# Patient Record
Sex: MALE | Race: WHITE | NOT HISPANIC OR LATINO | Employment: FULL TIME | URBAN - METROPOLITAN AREA
[De-identification: names, ages, dates, MRNs, and addresses within clinical notes are randomized per-mention and may not be internally consistent; named-entity substitution may affect disease eponyms.]

---

## 2024-01-10 ENCOUNTER — CONSULT (OUTPATIENT)
Dept: SURGERY | Facility: CLINIC | Age: 39
End: 2024-01-10
Payer: COMMERCIAL

## 2024-01-10 VITALS
OXYGEN SATURATION: 98 % | WEIGHT: 311.4 LBS | HEIGHT: 71 IN | BODY MASS INDEX: 43.6 KG/M2 | HEART RATE: 92 BPM | TEMPERATURE: 98.3 F | RESPIRATION RATE: 18 BRPM | SYSTOLIC BLOOD PRESSURE: 129 MMHG | DIASTOLIC BLOOD PRESSURE: 84 MMHG

## 2024-01-10 DIAGNOSIS — M79.89 MASS OF SOFT TISSUE OF NECK: Primary | ICD-10-CM

## 2024-01-10 PROCEDURE — 99203 OFFICE O/P NEW LOW 30 MIN: CPT | Performed by: SURGERY

## 2024-01-10 RX ORDER — CHOLECALCIFEROL (VITAMIN D3) 1250 MCG
CAPSULE ORAL
COMMUNITY

## 2024-01-10 RX ORDER — ENALAPRIL MALEATE 20 MG/1
TABLET ORAL
COMMUNITY

## 2024-01-10 RX ORDER — SIMVASTATIN 10 MG
TABLET ORAL
COMMUNITY

## 2024-01-10 RX ORDER — ORAL SEMAGLUTIDE 14 MG/1
TABLET ORAL
COMMUNITY

## 2024-01-10 RX ORDER — METFORMIN HYDROCHLORIDE 500 MG/1
TABLET, EXTENDED RELEASE ORAL
COMMUNITY

## 2024-01-10 NOTE — PROGRESS NOTES
Assessment/Plan:    Mass of soft tissue of neck  38-year-old male with a soft tissue mass of his left posterior neck.    Plan:  This is most likely a mildly infected sebaceous cyst.  However given its location, and the posterior chain of lymph nodes in this area, would like to obtain an ultrasound to ensure this is not an infected lymph node.  Following his ultrasound he can return to clinic and we can discuss management moving forward.     Diagnoses and all orders for this visit:    Mass of soft tissue of neck  -     US head neck soft tissue; Future    Other orders  -     Multiple Vitamin (MULTIVITAMIN ADULT PO); Take 1 tablet by mouth daily  -     aspirin-acetaminophen-caffeine (EXCEDRIN MIGRAINE) 250-250-65 MG per tablet; Take 1 tablet by mouth  -     Cholecalciferol (Vitamin D3) 1.25 MG (44527 UT) CAPS  -     enalapril (VASOTEC) 20 mg tablet  -     metFORMIN (GLUCOPHAGE-XR) 500 mg 24 hr tablet  -     Rybelsus 14 MG tablet  -     simvastatin (ZOCOR) 10 mg tablet          Subjective:      Patient ID: Alessandro Camejo is a 38 y.o. male.    38-year-old male presents with a soft tissue mass on his left posterior neck.  Patient states that between Thanksgiving and Hemal he noticed a slowly enlarging lump on the left posterior neck.  This has slowly increased in size since that time and is causing him mild pain and discomfort.  He denies any fevers, chills, chest pain, shortness of breath.  He denies any significant drainage in the area.  He does note with some flexion of his neck he has some discomfort in the region.        The following portions of the patient's history were reviewed and updated as appropriate: He  has a past medical history of Diabetes mellitus (HCC), Fissure, anal, Obesity, PONV (postoperative nausea and vomiting), and Pyloric stenosis in pediatric patient.  He   Patient Active Problem List    Diagnosis Date Noted    Mass of soft tissue of neck 01/10/2024     He  has a past surgical history  that includes Kidney stone surgery.  His family history is not on file.  He  reports that he has never smoked. He has never used smokeless tobacco. He reports current alcohol use. He reports current drug use. Drug: Marijuana.  Current Outpatient Medications   Medication Sig Dispense Refill    aspirin-acetaminophen-caffeine (EXCEDRIN MIGRAINE) 250-250-65 MG per tablet Take 1 tablet by mouth      Cholecalciferol (Vitamin D3) 1.25 MG (16603 UT) CAPS       enalapril (VASOTEC) 20 mg tablet       metFORMIN (GLUCOPHAGE-XR) 500 mg 24 hr tablet       Multiple Vitamin (MULTIVITAMIN ADULT PO) Take 1 tablet by mouth daily      Rybelsus 14 MG tablet       simvastatin (ZOCOR) 10 mg tablet        No current facility-administered medications for this visit.     Current Outpatient Medications on File Prior to Visit   Medication Sig    aspirin-acetaminophen-caffeine (EXCEDRIN MIGRAINE) 250-250-65 MG per tablet Take 1 tablet by mouth    Cholecalciferol (Vitamin D3) 1.25 MG (07940 UT) CAPS     enalapril (VASOTEC) 20 mg tablet     metFORMIN (GLUCOPHAGE-XR) 500 mg 24 hr tablet     Multiple Vitamin (MULTIVITAMIN ADULT PO) Take 1 tablet by mouth daily    Rybelsus 14 MG tablet     simvastatin (ZOCOR) 10 mg tablet      No current facility-administered medications on file prior to visit.     He has No Known Allergies..    Review of Systems   Constitutional:  Negative for appetite change, chills, diaphoresis and fever.   HENT:  Negative for nosebleeds and trouble swallowing.    Eyes: Negative.    Respiratory:  Negative for cough, shortness of breath and wheezing.    Cardiovascular:  Negative for chest pain, palpitations and leg swelling.   Gastrointestinal:  Negative for abdominal distention, abdominal pain, nausea and vomiting.   Genitourinary:  Negative for difficulty urinating, flank pain and frequency.   Musculoskeletal:  Negative for arthralgias, joint swelling and myalgias.   Skin:  Positive for wound. Negative for pallor and rash.  "  Neurological:  Negative for dizziness, facial asymmetry and speech difficulty.   Hematological:  Does not bruise/bleed easily.   Psychiatric/Behavioral:  Negative for agitation and confusion.    All other systems reviewed and are negative.        Objective:      /84 (BP Location: Left arm, Patient Position: Sitting, Cuff Size: Large)   Pulse 92   Temp 98.3 °F (36.8 °C) (Temporal)   Resp 18   Ht 5' 11\" (1.803 m)   Wt (!) 141 kg (311 lb 6.4 oz)   SpO2 98%   BMI 43.43 kg/m²          Physical Exam  Vitals and nursing note reviewed.   Constitutional:       General: He is not in acute distress.     Appearance: Normal appearance. He is not toxic-appearing.   HENT:      Head: Normocephalic and atraumatic.      Mouth/Throat:      Mouth: Mucous membranes are moist.   Eyes:      Extraocular Movements: Extraocular movements intact.      Pupils: Pupils are equal, round, and reactive to light.   Neck:      Comments: Left posterior neck with a 3 x 2 cm area of induration and firmness associated with some mild overlying erythema.  Cardiovascular:      Rate and Rhythm: Normal rate and regular rhythm.      Pulses: Normal pulses.   Pulmonary:      Effort: Pulmonary effort is normal. No respiratory distress.      Breath sounds: Normal breath sounds. No wheezing.   Abdominal:      General: There is no distension.      Palpations: Abdomen is soft. There is no mass.      Tenderness: There is no abdominal tenderness. There is no guarding or rebound.      Hernia: No hernia is present.   Musculoskeletal:         General: No swelling or deformity. Normal range of motion.      Cervical back: Normal range of motion and neck supple.      Right lower leg: No edema.      Left lower leg: No edema.   Skin:     General: Skin is warm and dry.      Coloration: Skin is not jaundiced.   Neurological:      General: No focal deficit present.      Mental Status: He is alert and oriented to person, place, and time.   Psychiatric:         Mood " and Affect: Mood normal.         Behavior: Behavior normal.

## 2024-01-10 NOTE — ASSESSMENT & PLAN NOTE
38-year-old male with a soft tissue mass of his left posterior neck.    Plan:  This is most likely a mildly infected sebaceous cyst.  However given its location, and the posterior chain of lymph nodes in this area, would like to obtain an ultrasound to ensure this is not an infected lymph node.  Following his ultrasound he can return to clinic and we can discuss management moving forward.

## 2024-01-17 ENCOUNTER — HOSPITAL ENCOUNTER (OUTPATIENT)
Dept: ULTRASOUND IMAGING | Facility: HOSPITAL | Age: 39
Discharge: HOME | End: 2024-01-17
Attending: SURGERY
Payer: COMMERCIAL

## 2024-01-17 DIAGNOSIS — M79.89 MASS OF SOFT TISSUE OF NECK: ICD-10-CM

## 2024-01-17 PROCEDURE — 76536 US EXAM OF HEAD AND NECK: CPT

## 2024-02-14 ENCOUNTER — OFFICE VISIT (OUTPATIENT)
Dept: SURGERY | Facility: CLINIC | Age: 39
End: 2024-02-14
Payer: COMMERCIAL

## 2024-02-14 VITALS
BODY MASS INDEX: 43.79 KG/M2 | OXYGEN SATURATION: 96 % | HEIGHT: 71 IN | WEIGHT: 312.8 LBS | RESPIRATION RATE: 19 BRPM | TEMPERATURE: 98.4 F | SYSTOLIC BLOOD PRESSURE: 125 MMHG | DIASTOLIC BLOOD PRESSURE: 79 MMHG | HEART RATE: 83 BPM

## 2024-02-14 DIAGNOSIS — E11.59 HIGH BLOOD PRESSURE ASSOCIATED WITH DIABETES: ICD-10-CM

## 2024-02-14 DIAGNOSIS — M79.89 MASS OF SOFT TISSUE OF NECK: Primary | ICD-10-CM

## 2024-02-14 DIAGNOSIS — I15.2 HIGH BLOOD PRESSURE ASSOCIATED WITH DIABETES: ICD-10-CM

## 2024-02-14 PROCEDURE — 99213 OFFICE O/P EST LOW 20 MIN: CPT | Performed by: SURGERY

## 2024-02-14 RX ORDER — AMOXICILLIN AND CLAVULANATE POTASSIUM 875; 125 MG/1; MG/1
1 TABLET, FILM COATED ORAL EVERY 12 HOURS SCHEDULED
Qty: 14 TABLET | Refills: 0 | Status: SHIPPED | OUTPATIENT
Start: 2024-02-14 | End: 2024-02-21

## 2024-02-14 NOTE — PROGRESS NOTES
Assessment/Plan:    Mass of soft tissue of neck  38-year-old male with an indeterminant soft tissue mass of the left lower neck.    Plan:  Upon review of his ultrasound, this is either a sebaceous cyst or some suppurative lymphadenitis.  As such I would like to treat with a week of antibiotics to see if he has improvement in his symptomatology, with subsequent MRI of the neck.  Following this he will return to clinic and we can discuss next steps.     Diagnoses and all orders for this visit:    Mass of soft tissue of neck  -     MRI soft tissue neck wo contrast; Future  -     amoxicillin-clavulanate (AUGMENTIN) 875-125 mg per tablet; Take 1 tablet by mouth every 12 (twelve) hours for 7 days    High blood pressure associated with diabetes     Other orders  -     Multiple Vitamins-Minerals (Centrum Men) TABS          Subjective:      Patient ID: Alessandro Camejo is a 38 y.o. male.    38-year-old male well-known to me presents with a mass of the soft tissues of his left neck.  Since her last visit he underwent ultrasound of the neck which I reviewed in PACS.  This was done on 1/17/2024.  Findings were:3.8 cm heterogeneous collection in the region of palpable abnormality with adjacent hyperemia and tract to the skin surface. Findings favored to represent suppurative lymphadenitis versus sebaceous cyst/abscess. Recommend close clinical follow-up with   repeat imaging for change in size/symptoms.    Currently, he feels about the same with intermittent episodes of pain, redness, and drainage from the wound.  Denies any fevers, chills, chest pain, shortness of breath.  Is tolerating a diet, moving his bowels normally.          The following portions of the patient's history were reviewed and updated as appropriate: He  has a past medical history of Diabetes mellitus (HCC), Fissure, anal, Obesity, PONV (postoperative nausea and vomiting), and Pyloric stenosis in pediatric patient.  He   Patient Active Problem List     Diagnosis Date Noted    High blood pressure associated with diabetes  02/14/2024    Mass of soft tissue of neck 01/10/2024     He  has a past surgical history that includes Kidney stone surgery.  His family history is not on file.  He  reports that he has never smoked. He has never used smokeless tobacco. He reports current alcohol use. He reports current drug use. Drug: Marijuana.  Current Outpatient Medications   Medication Sig Dispense Refill    amoxicillin-clavulanate (AUGMENTIN) 875-125 mg per tablet Take 1 tablet by mouth every 12 (twelve) hours for 7 days 14 tablet 0    aspirin-acetaminophen-caffeine (EXCEDRIN MIGRAINE) 250-250-65 MG per tablet Take 1 tablet by mouth      Cholecalciferol (Vitamin D3) 1.25 MG (72377 UT) CAPS       enalapril (VASOTEC) 20 mg tablet       metFORMIN (GLUCOPHAGE-XR) 500 mg 24 hr tablet       Multiple Vitamin (MULTIVITAMIN ADULT PO) Take 1 tablet by mouth daily      Multiple Vitamins-Minerals (Centrum Men) TABS       Rybelsus 14 MG tablet       simvastatin (ZOCOR) 10 mg tablet        No current facility-administered medications for this visit.     Current Outpatient Medications on File Prior to Visit   Medication Sig    aspirin-acetaminophen-caffeine (EXCEDRIN MIGRAINE) 250-250-65 MG per tablet Take 1 tablet by mouth    Cholecalciferol (Vitamin D3) 1.25 MG (07151 UT) CAPS     enalapril (VASOTEC) 20 mg tablet     metFORMIN (GLUCOPHAGE-XR) 500 mg 24 hr tablet     Multiple Vitamin (MULTIVITAMIN ADULT PO) Take 1 tablet by mouth daily    Multiple Vitamins-Minerals (Centrum Men) TABS     Rybelsus 14 MG tablet     simvastatin (ZOCOR) 10 mg tablet      No current facility-administered medications on file prior to visit.     He has No Known Allergies..    Review of Systems   Constitutional:  Negative for appetite change, chills, diaphoresis and fever.   HENT:  Negative for nosebleeds and trouble swallowing.    Eyes: Negative.    Respiratory:  Negative for cough, shortness of breath and  "wheezing.    Cardiovascular:  Negative for chest pain, palpitations and leg swelling.   Gastrointestinal:  Negative for abdominal distention, abdominal pain, nausea and vomiting.   Genitourinary:  Negative for difficulty urinating, flank pain and frequency.   Musculoskeletal:  Negative for arthralgias, joint swelling and myalgias.   Skin:  Positive for wound. Negative for pallor and rash.   Neurological:  Negative for dizziness, facial asymmetry and speech difficulty.   Hematological:  Does not bruise/bleed easily.   Psychiatric/Behavioral:  Negative for agitation and confusion.    All other systems reviewed and are negative.        Objective:      /79 (BP Location: Left arm, Patient Position: Sitting, Cuff Size: Large)   Pulse 83   Temp 98.4 °F (36.9 °C) (Temporal)   Resp 19   Ht 5' 11\" (1.803 m)   Wt (!) 142 kg (312 lb 12.8 oz)   SpO2 96%   BMI 43.63 kg/m²          Physical Exam  Vitals and nursing note reviewed.   Constitutional:       General: He is not in acute distress.     Appearance: Normal appearance. He is not toxic-appearing.   HENT:      Head: Normocephalic and atraumatic.      Mouth/Throat:      Mouth: Mucous membranes are moist.   Eyes:      Extraocular Movements: Extraocular movements intact.      Pupils: Pupils are equal, round, and reactive to light.   Cardiovascular:      Rate and Rhythm: Normal rate and regular rhythm.      Pulses: Normal pulses.   Pulmonary:      Effort: Pulmonary effort is normal. No respiratory distress.      Breath sounds: Normal breath sounds. No wheezing.   Abdominal:      General: There is no distension.      Palpations: Abdomen is soft. There is no mass.      Tenderness: There is no abdominal tenderness. There is no guarding or rebound.      Hernia: No hernia is present.   Musculoskeletal:         General: No swelling or deformity. Normal range of motion.      Cervical back: Normal range of motion and neck supple.      Right lower leg: No edema.      Left " lower leg: No edema.   Skin:     General: Skin is warm and dry.      Coloration: Skin is not jaundiced.      Comments: Left base of the neck wound with an area of approximately 4 x 2 cm of induration with minimal overlying erythema.  No active drainage.   Neurological:      General: No focal deficit present.      Mental Status: He is alert and oriented to person, place, and time.   Psychiatric:         Mood and Affect: Mood normal.         Behavior: Behavior normal.

## 2024-02-14 NOTE — ASSESSMENT & PLAN NOTE
38-year-old male with an indeterminant soft tissue mass of the left lower neck.    Plan:  Upon review of his ultrasound, this is either a sebaceous cyst or some suppurative lymphadenitis.  As such I would like to treat with a week of antibiotics to see if he has improvement in his symptomatology, with subsequent MRI of the neck.  Following this he will return to clinic and we can discuss next steps.

## 2024-03-03 ENCOUNTER — HOSPITAL ENCOUNTER (OUTPATIENT)
Dept: MRI IMAGING | Facility: HOSPITAL | Age: 39
Discharge: HOME | End: 2024-03-03
Attending: SURGERY
Payer: COMMERCIAL

## 2024-03-03 DIAGNOSIS — M79.89 MASS OF SOFT TISSUE OF NECK: ICD-10-CM

## 2024-03-03 PROCEDURE — G1004 CDSM NDSC: HCPCS

## 2024-03-03 PROCEDURE — 70540 MRI ORBIT/FACE/NECK W/O DYE: CPT

## 2024-03-13 ENCOUNTER — OFFICE VISIT (OUTPATIENT)
Dept: SURGERY | Facility: CLINIC | Age: 39
End: 2024-03-13
Payer: COMMERCIAL

## 2024-03-13 VITALS
OXYGEN SATURATION: 97 % | HEIGHT: 71 IN | RESPIRATION RATE: 18 BRPM | BODY MASS INDEX: 42.64 KG/M2 | HEART RATE: 94 BPM | WEIGHT: 304.6 LBS | TEMPERATURE: 98.4 F | SYSTOLIC BLOOD PRESSURE: 141 MMHG | DIASTOLIC BLOOD PRESSURE: 85 MMHG

## 2024-03-13 DIAGNOSIS — M79.89 MASS OF SOFT TISSUE OF NECK: Primary | ICD-10-CM

## 2024-03-13 PROCEDURE — 99213 OFFICE O/P EST LOW 20 MIN: CPT | Performed by: SURGERY

## 2024-03-13 NOTE — ASSESSMENT & PLAN NOTE
38-year-old male with a history of a left posterior neck soft tissue mass, improved.    Plan:  Symptomatology was improved and drainage stopped with antibiotics.  MRI was mostly unrevealing but showed no necessarily concerning findings, just some resolving edema/inflammation.  We will hold off on any surgical intervention at this time, and he may return to clinic as needed.  We did discuss, that should his symptoms return we would plan on an excision in the operating room of this lesion.

## 2024-03-13 NOTE — PROGRESS NOTES
Assessment/Plan:    Mass of soft tissue of neck  38-year-old male with a history of a left posterior neck soft tissue mass, improved.    Plan:  Symptomatology was improved and drainage stopped with antibiotics.  MRI was mostly unrevealing but showed no necessarily concerning findings, just some resolving edema/inflammation.  We will hold off on any surgical intervention at this time, and he may return to clinic as needed.  We did discuss, that should his symptoms return we would plan on an excision in the operating room of this lesion.     Diagnoses and all orders for this visit:    Mass of soft tissue of neck          Subjective:      Patient ID: Alessandro Camejo is a 38 y.o. male.    38-year-old male well-known to me for a left neck soft tissue mass.  Since his last visit, he completed a 1 week course of antibiotics.  The drainage and local inflammatory changes around his neck have completely resolved.  He denies any pain, fevers, chills, chest pain, or shortness of breath.  He has noticed no erythema or drainage from the wound.  Since her last visit he underwent MRI of the neck which I reviewed in PACS from 3/3/2024.  This was consistent with nonspecific inflammatory changes underneath the skin, with no notable masses or lesions.        The following portions of the patient's history were reviewed and updated as appropriate: He  has a past medical history of Diabetes mellitus (HCC), Fissure, anal, Obesity, PONV (postoperative nausea and vomiting), and Pyloric stenosis in pediatric patient.  He   Patient Active Problem List    Diagnosis Date Noted    High blood pressure associated with diabetes  02/14/2024    Mass of soft tissue of neck 01/10/2024     He  has a past surgical history that includes Kidney stone surgery.  His family history is not on file.  He  reports that he has never smoked. He has never used smokeless tobacco. He reports current alcohol use. He reports current drug use. Drug: Marijuana.  Current  Outpatient Medications   Medication Sig Dispense Refill    aspirin-acetaminophen-caffeine (EXCEDRIN MIGRAINE) 250-250-65 MG per tablet Take 1 tablet by mouth      Cholecalciferol (Vitamin D3) 1.25 MG (82032 UT) CAPS       enalapril (VASOTEC) 20 mg tablet       metFORMIN (GLUCOPHAGE-XR) 500 mg 24 hr tablet       Multiple Vitamin (MULTIVITAMIN ADULT PO) Take 1 tablet by mouth daily      Multiple Vitamins-Minerals (Centrum Men) TABS       Rybelsus 14 MG tablet       simvastatin (ZOCOR) 10 mg tablet        No current facility-administered medications for this visit.     Current Outpatient Medications on File Prior to Visit   Medication Sig    aspirin-acetaminophen-caffeine (EXCEDRIN MIGRAINE) 250-250-65 MG per tablet Take 1 tablet by mouth    Cholecalciferol (Vitamin D3) 1.25 MG (02943 UT) CAPS     enalapril (VASOTEC) 20 mg tablet     metFORMIN (GLUCOPHAGE-XR) 500 mg 24 hr tablet     Multiple Vitamin (MULTIVITAMIN ADULT PO) Take 1 tablet by mouth daily    Multiple Vitamins-Minerals (Centrum Men) TABS     Rybelsus 14 MG tablet     simvastatin (ZOCOR) 10 mg tablet      No current facility-administered medications on file prior to visit.     He has No Known Allergies..    Review of Systems   Constitutional:  Negative for appetite change, chills, diaphoresis and fever.   HENT:  Negative for nosebleeds and trouble swallowing.    Eyes: Negative.    Respiratory:  Negative for cough, shortness of breath and wheezing.    Cardiovascular:  Negative for chest pain, palpitations and leg swelling.   Gastrointestinal:  Negative for abdominal distention, abdominal pain, nausea and vomiting.   Genitourinary:  Negative for difficulty urinating, flank pain and frequency.   Musculoskeletal:  Negative for arthralgias, joint swelling and myalgias.   Skin:  Positive for rash. Negative for pallor.   Neurological:  Negative for dizziness, facial asymmetry and speech difficulty.   Hematological:  Does not bruise/bleed easily.  "  Psychiatric/Behavioral:  Negative for agitation and confusion.    All other systems reviewed and are negative.        Objective:      /85 (BP Location: Left arm, Patient Position: Sitting, Cuff Size: Large)   Pulse 94   Temp 98.4 °F (36.9 °C) (Temporal)   Resp 18   Ht 5' 11\" (1.803 m)   Wt (!) 138 kg (304 lb 9.6 oz)   SpO2 97%   BMI 42.48 kg/m²          Physical Exam  Vitals and nursing note reviewed.   Constitutional:       General: He is not in acute distress.     Appearance: Normal appearance. He is not toxic-appearing.   HENT:      Head: Normocephalic and atraumatic.      Mouth/Throat:      Mouth: Mucous membranes are moist.   Eyes:      Extraocular Movements: Extraocular movements intact.      Pupils: Pupils are equal, round, and reactive to light.   Neck:      Comments: Small posterior neck wound, completely closed.  No erythema, induration, or drainage.  Cardiovascular:      Rate and Rhythm: Normal rate and regular rhythm.      Pulses: Normal pulses.   Pulmonary:      Effort: Pulmonary effort is normal. No respiratory distress.      Breath sounds: Normal breath sounds. No wheezing.   Abdominal:      General: There is no distension.      Palpations: Abdomen is soft. There is no mass.      Tenderness: There is no abdominal tenderness. There is no guarding or rebound.      Hernia: No hernia is present.   Musculoskeletal:         General: No swelling or deformity. Normal range of motion.      Cervical back: Normal range of motion and neck supple.      Right lower leg: No edema.      Left lower leg: No edema.   Skin:     General: Skin is warm and dry.      Coloration: Skin is not jaundiced.   Neurological:      General: No focal deficit present.      Mental Status: He is alert and oriented to person, place, and time.   Psychiatric:         Mood and Affect: Mood normal.         Behavior: Behavior normal.           "

## 2025-03-21 ENCOUNTER — OFFICE VISIT (OUTPATIENT)
Dept: SURGERY | Facility: CLINIC | Age: 40
End: 2025-03-21
Payer: COMMERCIAL

## 2025-03-21 DIAGNOSIS — J34.3 NASAL TURBINATE HYPERTROPHY: ICD-10-CM

## 2025-03-21 DIAGNOSIS — M79.89 MASS OF SOFT TISSUE OF NECK: Primary | ICD-10-CM

## 2025-03-21 PROCEDURE — 99214 OFFICE O/P EST MOD 30 MIN: CPT | Performed by: SURGERY

## 2025-03-21 RX ORDER — SULFAMETHOXAZOLE AND TRIMETHOPRIM 800; 160 MG/1; MG/1
1 TABLET ORAL EVERY 12 HOURS SCHEDULED
Qty: 14 TABLET | Refills: 0 | Status: SHIPPED | OUTPATIENT
Start: 2025-03-21 | End: 2025-03-28

## 2025-03-21 NOTE — PROGRESS NOTES
Name: Alessandro Camejo      : 1985      MRN: [id number]  Encounter Provider: Santiago Kim MD  Encounter Date: 3/21/2025   Encounter department: [facility]  :  Assessment & Plan  Mass of soft tissue of neck  39-year-old male with an infected soft tissue mass of his left neck.    Plan:  - Will plan on excision of the mass. Risks and benefits of surgery were reviewed and the patient was amenable. Specific risks reviewed include: post op seroma, hematoma, or recurrence of the mass.  - Will schedule at the patient's earliest convenience.  - I have placed an order for Bactrim to deal with some inflammatory changes prior to going to the operating room.    Orders:    sulfamethoxazole-trimethoprim (BACTRIM DS) 800-160 mg per tablet; Take 1 tablet by mouth every 12 (twelve) hours for 7 days    Case request operating room: EXCISION BIOPSY MASS OF LEFT NECK; Standing    Ambulatory Referral to Otolaryngology; Future    Nasal turbinate hypertrophy  Patient describing swelling and pain for his nasal turbinates, and is requesting a referral to ENT.  I placed that referral.             History of Present Illness   HPI  Alessandro Camejo is a 39 y.o. male who presents with a recurrent soft tissue mass infection.  He has a known left neck soft tissue mass, that states over the past week he has noticed increased swelling and warmth of the area.  Denies any significant drainage.  Denies any fevers, chills, chest pain, shortness of breath.  Is also complaining of significant pain in his nose with swelling of his nasal turbinates.  History obtained from: patient    Review of Systems   Constitutional:  Negative for appetite change, chills, diaphoresis and fever.   HENT:  Negative for nosebleeds and trouble swallowing.    Eyes: Negative.    Respiratory:  Negative for cough, shortness of breath and wheezing.    Cardiovascular:  Negative for chest pain, palpitations and leg swelling.    Gastrointestinal:  Negative for abdominal distention, abdominal pain, nausea and vomiting.   Genitourinary:  Negative for difficulty urinating, flank pain and frequency.   Musculoskeletal:  Negative for arthralgias, joint swelling and myalgias.   Skin:  Negative for pallor and rash.   Neurological:  Negative for dizziness, facial asymmetry and speech difficulty.   Hematological:  Does not bruise/bleed easily.   Psychiatric/Behavioral:  Negative for agitation and confusion.    All other systems reviewed and are negative.    Past Medical History   Past Medical History:   Diagnosis Date    Diabetes mellitus (HCC)     Fissure, anal     Obesity     PONV (postoperative nausea and vomiting)     Pyloric stenosis in pediatric patient      Past Surgical History:   Procedure Laterality Date    KIDNEY STONE SURGERY       No family history on file.   reports that he has never smoked. He has never used smokeless tobacco. He reports current alcohol use. He reports current drug use. Drug: Marijuana.  Current Outpatient Medications   Medication Instructions    aspirin-acetaminophen-caffeine (EXCEDRIN MIGRAINE) 250-250-65 MG per tablet 1 tablet, Oral    Cholecalciferol (Vitamin D3) 1.25 MG (77083 UT) CAPS     enalapril (VASOTEC) 20 mg tablet     metFORMIN (GLUCOPHAGE-XR) 500 mg 24 hr tablet     Multiple Vitamin (MULTIVITAMIN ADULT PO) 1 tablet, Oral, Daily    Multiple Vitamins-Minerals (Centrum Men) TABS     Rybelsus 14 MG tablet     simvastatin (ZOCOR) 10 mg tablet     sulfamethoxazole-trimethoprim (BACTRIM DS) 800-160 mg per tablet 1 tablet, Oral, Every 12 hours scheduled   No Known Allergies   Current Outpatient Medications on File Prior to Visit   Medication Sig Dispense Refill    aspirin-acetaminophen-caffeine (EXCEDRIN MIGRAINE) 250-250-65 MG per tablet Take 1 tablet by mouth      Cholecalciferol (Vitamin D3) 1.25 MG (16000 UT) CAPS       enalapril (VASOTEC) 20 mg tablet       metFORMIN (GLUCOPHAGE-XR) 500 mg 24 hr tablet        Multiple Vitamin (MULTIVITAMIN ADULT PO) Take 1 tablet by mouth daily      Multiple Vitamins-Minerals (Centrum Men) TABS       Rybelsus 14 MG tablet       simvastatin (ZOCOR) 10 mg tablet        No current facility-administered medications on file prior to visit.      Social History     Tobacco Use    Smoking status: Never    Smokeless tobacco: Never   Vaping Use    Vaping status: Never Used   Substance and Sexual Activity    Alcohol use: Yes     Comment: socially    Drug use: Yes     Types: Marijuana    Sexual activity: Not on file        Objective   There were no vitals taken for this visit.     Physical Exam  Vitals and nursing note reviewed.   Constitutional:       General: He is not in acute distress.     Appearance: Normal appearance. He is not ill-appearing.   HENT:      Head: Normocephalic and atraumatic.      Mouth/Throat:      Mouth: Mucous membranes are moist.      Pharynx: Oropharynx is clear.   Eyes:      Extraocular Movements: Extraocular movements intact.   Cardiovascular:      Rate and Rhythm: Normal rate and regular rhythm.   Pulmonary:      Effort: Pulmonary effort is normal. No respiratory distress.      Breath sounds: No stridor. No wheezing.   Abdominal:      General: There is no distension.      Palpations: Abdomen is soft. There is no mass.      Tenderness: There is no abdominal tenderness.      Hernia: No hernia is present.   Musculoskeletal:         General: No swelling or tenderness. Normal range of motion.      Cervical back: Neck supple.   Skin:     General: Skin is warm and dry.      Findings: Lesion present.      Comments: Firmness and dimpling of the skin in the left mid neck with underlying cystic mass.  No significant erythema or induration.   Neurological:      General: No focal deficit present.      Mental Status: He is alert and oriented to person, place, and time. Mental status is at baseline.   Psychiatric:         Mood and Affect: Mood normal.         Behavior: Behavior  normal.

## 2025-03-21 NOTE — ASSESSMENT & PLAN NOTE
Patient describing swelling and pain for his nasal turbinates, and is requesting a referral to ENT.  I placed that referral.

## 2025-03-21 NOTE — H&P (VIEW-ONLY)
Name: Alessandro Camejo      : 1985      MRN: [id number]  Encounter Provider: Santiago Kim MD  Encounter Date: 3/21/2025   Encounter department[facility]  :  Assessment & Plan  Mass of soft tissue of neck  39-year-old male with an infected soft tissue mass of his left neck.    Plan:  - Will plan on excision of the mass. Risks and benefits of surgery were reviewed and the patient was amenable. Specific risks reviewed include: post op seroma, hematoma, or recurrence of the mass.  - Will schedule at the patient's earliest convenience.  - I have placed an order for Bactrim to deal with some inflammatory changes prior to going to the operating room.    Orders:    sulfamethoxazole-trimethoprim (BACTRIM DS) 800-160 mg per tablet; Take 1 tablet by mouth every 12 (twelve) hours for 7 days    Case request operating room: EXCISION BIOPSY MASS OF LEFT NECK; Standing    Ambulatory Referral to Otolaryngology; Future    Nasal turbinate hypertrophy  Patient describing swelling and pain for his nasal turbinates, and is requesting a referral to ENT.  I placed that referral.             History of Present Illness   HPI  Alessandro Camejo is a 39 y.o. male who presents with a recurrent soft tissue mass infection.  He has a known left neck soft tissue mass, that states over the past week he has noticed increased swelling and warmth of the area.  Denies any significant drainage.  Denies any fevers, chills, chest pain, shortness of breath.  Is also complaining of significant pain in his nose with swelling of his nasal turbinates.  History obtained from: patient    Review of Systems   Constitutional:  Negative for appetite change, chills, diaphoresis and fever.   HENT:  Negative for nosebleeds and trouble swallowing.    Eyes: Negative.    Respiratory:  Negative for cough, shortness of breath and wheezing.    Cardiovascular:  Negative for chest pain, palpitations and leg swelling.    Gastrointestinal:  Negative for abdominal distention, abdominal pain, nausea and vomiting.   Genitourinary:  Negative for difficulty urinating, flank pain and frequency.   Musculoskeletal:  Negative for arthralgias, joint swelling and myalgias.   Skin:  Negative for pallor and rash.   Neurological:  Negative for dizziness, facial asymmetry and speech difficulty.   Hematological:  Does not bruise/bleed easily.   Psychiatric/Behavioral:  Negative for agitation and confusion.    All other systems reviewed and are negative.    Past Medical History   Past Medical History:   Diagnosis Date    Diabetes mellitus (HCC)     Fissure, anal     Obesity     PONV (postoperative nausea and vomiting)     Pyloric stenosis in pediatric patient      Past Surgical History:   Procedure Laterality Date    KIDNEY STONE SURGERY       No family history on file.   reports that he has never smoked. He has never used smokeless tobacco. He reports current alcohol use. He reports current drug use. Drug: Marijuana.  Current Outpatient Medications   Medication Instructions    aspirin-acetaminophen-caffeine (EXCEDRIN MIGRAINE) 250-250-65 MG per tablet 1 tablet, Oral    Cholecalciferol (Vitamin D3) 1.25 MG (01463 UT) CAPS     enalapril (VASOTEC) 20 mg tablet     metFORMIN (GLUCOPHAGE-XR) 500 mg 24 hr tablet     Multiple Vitamin (MULTIVITAMIN ADULT PO) 1 tablet, Oral, Daily    Multiple Vitamins-Minerals (Centrum Men) TABS     Rybelsus 14 MG tablet     simvastatin (ZOCOR) 10 mg tablet     sulfamethoxazole-trimethoprim (BACTRIM DS) 800-160 mg per tablet 1 tablet, Oral, Every 12 hours scheduled   No Known Allergies   Current Outpatient Medications on File Prior to Visit   Medication Sig Dispense Refill    aspirin-acetaminophen-caffeine (EXCEDRIN MIGRAINE) 250-250-65 MG per tablet Take 1 tablet by mouth      Cholecalciferol (Vitamin D3) 1.25 MG (95278 UT) CAPS       enalapril (VASOTEC) 20 mg tablet       metFORMIN (GLUCOPHAGE-XR) 500 mg 24 hr tablet        Multiple Vitamin (MULTIVITAMIN ADULT PO) Take 1 tablet by mouth daily      Multiple Vitamins-Minerals (Centrum Men) TABS       Rybelsus 14 MG tablet       simvastatin (ZOCOR) 10 mg tablet        No current facility-administered medications on file prior to visit.      Social History     Tobacco Use    Smoking status: Never    Smokeless tobacco: Never   Vaping Use    Vaping status: Never Used   Substance and Sexual Activity    Alcohol use: Yes     Comment: socially    Drug use: Yes     Types: Marijuana    Sexual activity: Not on file        Objective   There were no vitals taken for this visit.     Physical Exam  Vitals and nursing note reviewed.   Constitutional:       General: He is not in acute distress.     Appearance: Normal appearance. He is not ill-appearing.   HENT:      Head: Normocephalic and atraumatic.      Mouth/Throat:      Mouth: Mucous membranes are moist.      Pharynx: Oropharynx is clear.   Eyes:      Extraocular Movements: Extraocular movements intact.   Cardiovascular:      Rate and Rhythm: Normal rate and regular rhythm.   Pulmonary:      Effort: Pulmonary effort is normal. No respiratory distress.      Breath sounds: No stridor. No wheezing.   Abdominal:      General: There is no distension.      Palpations: Abdomen is soft. There is no mass.      Tenderness: There is no abdominal tenderness.      Hernia: No hernia is present.   Musculoskeletal:         General: No swelling or tenderness. Normal range of motion.      Cervical back: Neck supple.   Skin:     General: Skin is warm and dry.      Findings: Lesion present.      Comments: Firmness and dimpling of the skin in the left mid neck with underlying cystic mass.  No significant erythema or induration.   Neurological:      General: No focal deficit present.      Mental Status: He is alert and oriented to person, place, and time. Mental status is at baseline.   Psychiatric:         Mood and Affect: Mood normal.         Behavior: Behavior  normal.

## 2025-03-21 NOTE — ASSESSMENT & PLAN NOTE
39-year-old male with an infected soft tissue mass of his left neck.    Plan:  - Will plan on excision of the mass. Risks and benefits of surgery were reviewed and the patient was amenable. Specific risks reviewed include: post op seroma, hematoma, or recurrence of the mass.  - Will schedule at the patient's earliest convenience.  - I have placed an order for Bactrim to deal with some inflammatory changes prior to going to the operating room.    Orders:    sulfamethoxazole-trimethoprim (BACTRIM DS) 800-160 mg per tablet; Take 1 tablet by mouth every 12 (twelve) hours for 7 days    Case request operating room: EXCISION BIOPSY MASS OF LEFT NECK; Standing    Ambulatory Referral to Otolaryngology; Future

## 2025-04-01 NOTE — PRE-PROCEDURE INSTRUCTIONS
Pre-Surgery Instructions:   Medication Instructions    aspirin-acetaminophen-caffeine (EXCEDRIN MIGRAINE) 250-250-65 MG per tablet Hold day of surgery.    enalapril (VASOTEC) 20 mg tablet Hold day of surgery.    metFORMIN (GLUCOPHAGE-XR) 500 mg 24 hr tablet Hold day of surgery.    Multiple Vitamin (MULTIVITAMIN ADULT PO) Stop taking 7 days prior to surgery.    Rybelsus 14 MG tablet Hold day of surgery.    simvastatin (ZOCOR) 10 mg tablet Take night before surgery    Medication instructions for day of surgery reviewed. Please take all instructed medications with only a sip of water.       You will receive a call one business day prior to surgery with an arrival time and hospital directions. If your surgery is scheduled on a Monday, the hospital will be calling you on the Friday prior to your surgery. If you have not heard from anyone by 8pm, please call the hospital supervisor through the hospital  at [phone]. ([place] [phone] or [place] [phone]).    Do not eat or drink anything after midnight the night before your surgery, including candy, mints, lifesavers, or chewing gum. Do not drink alcohol 24hrs before your surgery. Try not to smoke at least 24hrs before your surgery.       Follow the pre surgery showering instructions as listed in the “My Surgical Experience Booklet” or otherwise provided by your surgeon's office. Do not use a blade to shave the surgical area 1 week before surgery. It is okay to use a clean electric clippers up to 24 hours before surgery. Do not apply any lotions, creams, including makeup, cologne, deodorant, or perfumes after showering on the day of your surgery. Do not use dry shampoo, hair spray, hair gel, or any type of hair products.     No contact lenses, eye make-up, or artificial eyelashes. Remove nail polish, including gel polish, and any artificial, gel, or acrylic nails if possible. Remove all jewelry including rings and body piercing jewelry.     Wear  causal clothing that is easy to take on and off. Consider your type of surgery.    Keep any valuables, jewelry, piercings at home. Please bring any specially ordered equipment (sling, braces) if indicated.    Arrange for a responsible person to drive you to and from the hospital on the day of your surgery. Please confirm the visitor policy for the day of your procedure when you receive your phone call with an arrival time.     Call the surgeon's office with any new illnesses, exposures, or additional questions prior to surgery.    Please reference your “My Surgical Experience Booklet” for additional information to prepare for your upcoming surgery.

## 2025-04-03 ENCOUNTER — APPOINTMENT (OUTPATIENT)
Dept: LAB | Facility: HOSPITAL | Age: 40
End: 2025-04-03
Payer: COMMERCIAL

## 2025-04-03 DIAGNOSIS — M79.89 MASS OF SOFT TISSUE OF NECK: ICD-10-CM

## 2025-04-03 LAB
ALBUMIN SERPL BCG-MCNC: 4.3 G/DL (ref 3.5–5)
ALP SERPL-CCNC: 56 U/L (ref 34–104)
ALT SERPL W P-5'-P-CCNC: 53 U/L (ref 7–52)
ANION GAP SERPL CALCULATED.3IONS-SCNC: 12 MMOL/L (ref 4–13)
AST SERPL W P-5'-P-CCNC: 25 U/L (ref 13–39)
BASOPHILS # BLD AUTO: 0.09 THOUSANDS/ÂΜL (ref 0–0.1)
BASOPHILS NFR BLD AUTO: 1 % (ref 0–1)
BILIRUB SERPL-MCNC: 0.57 MG/DL (ref 0.2–1)
BUN SERPL-MCNC: 17 MG/DL (ref 5–25)
CALCIUM SERPL-MCNC: 9.4 MG/DL (ref 8.4–10.2)
CHLORIDE SERPL-SCNC: 103 MMOL/L (ref 96–108)
CO2 SERPL-SCNC: 25 MMOL/L (ref 21–32)
CREAT SERPL-MCNC: 0.93 MG/DL (ref 0.6–1.3)
EOSINOPHIL # BLD AUTO: 0.18 THOUSAND/ÂΜL (ref 0–0.61)
EOSINOPHIL NFR BLD AUTO: 2 % (ref 0–6)
ERYTHROCYTE [DISTWIDTH] IN BLOOD BY AUTOMATED COUNT: 11.9 % (ref 11.6–15.1)
GFR SERPL CREATININE-BSD FRML MDRD: 104 ML/MIN/1.73SQ M
GLUCOSE P FAST SERPL-MCNC: 97 MG/DL (ref 65–99)
HCT VFR BLD AUTO: 46.2 % (ref 36.5–49.3)
HGB BLD-MCNC: 15.6 G/DL (ref 12–17)
IMM GRANULOCYTES # BLD AUTO: 0.14 THOUSAND/UL (ref 0–0.2)
IMM GRANULOCYTES NFR BLD AUTO: 1 % (ref 0–2)
LYMPHOCYTES # BLD AUTO: 4.31 THOUSANDS/ÂΜL (ref 0.6–4.47)
LYMPHOCYTES NFR BLD AUTO: 45 % (ref 14–44)
MCH RBC QN AUTO: 28.8 PG (ref 26.8–34.3)
MCHC RBC AUTO-ENTMCNC: 33.8 G/DL (ref 31.4–37.4)
MCV RBC AUTO: 85 FL (ref 82–98)
MONOCYTES # BLD AUTO: 0.59 THOUSAND/ÂΜL (ref 0.17–1.22)
MONOCYTES NFR BLD AUTO: 6 % (ref 4–12)
NEUTROPHILS # BLD AUTO: 4.35 THOUSANDS/ÂΜL (ref 1.85–7.62)
NEUTS SEG NFR BLD AUTO: 45 % (ref 43–75)
NRBC BLD AUTO-RTO: 0 /100 WBCS
PLATELET # BLD AUTO: 347 THOUSANDS/UL (ref 149–390)
PMV BLD AUTO: 9.1 FL (ref 8.9–12.7)
POTASSIUM SERPL-SCNC: 3.5 MMOL/L (ref 3.5–5.3)
PROT SERPL-MCNC: 7 G/DL (ref 6.4–8.4)
RBC # BLD AUTO: 5.41 MILLION/UL (ref 3.88–5.62)
SODIUM SERPL-SCNC: 140 MMOL/L (ref 135–147)
WBC # BLD AUTO: 9.66 THOUSAND/UL (ref 4.31–10.16)

## 2025-04-03 PROCEDURE — 36415 COLL VENOUS BLD VENIPUNCTURE: CPT

## 2025-04-03 PROCEDURE — 85025 COMPLETE CBC W/AUTO DIFF WBC: CPT

## 2025-04-03 PROCEDURE — 80053 COMPREHEN METABOLIC PANEL: CPT

## 2025-04-13 ENCOUNTER — ANESTHESIA EVENT (OUTPATIENT)
Dept: PERIOP | Facility: HOSPITAL | Age: 40
End: 2025-04-13
Payer: COMMERCIAL

## 2025-04-14 ENCOUNTER — ANESTHESIA (OUTPATIENT)
Dept: PERIOP | Facility: HOSPITAL | Age: 40
End: 2025-04-14
Payer: COMMERCIAL

## 2025-04-14 ENCOUNTER — HOSPITAL ENCOUNTER (OUTPATIENT)
Facility: HOSPITAL | Age: 40
Setting detail: OUTPATIENT SURGERY
Discharge: HOME | End: 2025-04-14
Attending: SURGERY | Admitting: SURGERY
Payer: COMMERCIAL

## 2025-04-14 VITALS
WEIGHT: 305.6 LBS | HEART RATE: 78 BPM | BODY MASS INDEX: 42.78 KG/M2 | SYSTOLIC BLOOD PRESSURE: 116 MMHG | OXYGEN SATURATION: 97 % | RESPIRATION RATE: 14 BRPM | DIASTOLIC BLOOD PRESSURE: 70 MMHG | HEIGHT: 71 IN | TEMPERATURE: 98.6 F

## 2025-04-14 DIAGNOSIS — M79.89 MASS OF SOFT TISSUE OF NECK: ICD-10-CM

## 2025-04-14 LAB — GLUCOSE SERPL-MCNC: 107 MG/DL (ref 65–140)

## 2025-04-14 PROCEDURE — 12042 INTMD RPR N-HF/GENIT2.6-7.5: CPT | Performed by: SURGERY

## 2025-04-14 PROCEDURE — 82948 REAGENT STRIP/BLOOD GLUCOSE: CPT

## 2025-04-14 PROCEDURE — 88307 TISSUE EXAM BY PATHOLOGIST: CPT | Performed by: STUDENT IN AN ORGANIZED HEALTH CARE EDUCATION/TRAINING PROGRAM

## 2025-04-14 PROCEDURE — 11424 EXC H-F-NK-SP B9+MARG 3.1-4: CPT | Performed by: PHYSICIAN ASSISTANT

## 2025-04-14 PROCEDURE — 11424 EXC H-F-NK-SP B9+MARG 3.1-4: CPT | Performed by: SURGERY

## 2025-04-14 RX ORDER — ONDANSETRON 2 MG/ML
INJECTION INTRAMUSCULAR; INTRAVENOUS AS NEEDED
Status: DISCONTINUED | OUTPATIENT
Start: 2025-04-14 | End: 2025-04-14

## 2025-04-14 RX ORDER — BUPIVACAINE HYDROCHLORIDE 2.5 MG/ML
INJECTION, SOLUTION EPIDURAL; INFILTRATION; INTRACAUDAL; PERINEURAL AS NEEDED
Status: DISCONTINUED | OUTPATIENT
Start: 2025-04-14 | End: 2025-04-14 | Stop reason: HOSPADM

## 2025-04-14 RX ORDER — PROPOFOL 10 MG/ML
INJECTION, EMULSION INTRAVENOUS AS NEEDED
Status: DISCONTINUED | OUTPATIENT
Start: 2025-04-14 | End: 2025-04-14

## 2025-04-14 RX ORDER — FENTANYL CITRATE/PF 50 MCG/ML
25 SYRINGE (ML) INJECTION
Status: DISCONTINUED | OUTPATIENT
Start: 2025-04-14 | End: 2025-04-14 | Stop reason: HOSPADM

## 2025-04-14 RX ORDER — KETOROLAC TROMETHAMINE 30 MG/ML
INJECTION, SOLUTION INTRAMUSCULAR; INTRAVENOUS AS NEEDED
Status: DISCONTINUED | OUTPATIENT
Start: 2025-04-14 | End: 2025-04-14

## 2025-04-14 RX ORDER — GLYCOPYRROLATE 0.2 MG/ML
INJECTION INTRAMUSCULAR; INTRAVENOUS AS NEEDED
Status: DISCONTINUED | OUTPATIENT
Start: 2025-04-14 | End: 2025-04-14

## 2025-04-14 RX ORDER — LIDOCAINE HYDROCHLORIDE 10 MG/ML
INJECTION, SOLUTION EPIDURAL; INFILTRATION; INTRACAUDAL; PERINEURAL AS NEEDED
Status: DISCONTINUED | OUTPATIENT
Start: 2025-04-14 | End: 2025-04-14

## 2025-04-14 RX ORDER — HYDROMORPHONE HCL/PF 1 MG/ML
0.5 SYRINGE (ML) INJECTION
Status: DISCONTINUED | OUTPATIENT
Start: 2025-04-14 | End: 2025-04-14 | Stop reason: SDUPTHER

## 2025-04-14 RX ORDER — CEFAZOLIN SODIUM 2 G/50ML
2000 SOLUTION INTRAVENOUS ONCE
Status: COMPLETED | OUTPATIENT
Start: 2025-04-14 | End: 2025-04-14

## 2025-04-14 RX ORDER — FENTANYL CITRATE 50 UG/ML
INJECTION, SOLUTION INTRAMUSCULAR; INTRAVENOUS AS NEEDED
Status: DISCONTINUED | OUTPATIENT
Start: 2025-04-14 | End: 2025-04-14

## 2025-04-14 RX ORDER — MIDAZOLAM HYDROCHLORIDE 2 MG/2ML
INJECTION, SOLUTION INTRAMUSCULAR; INTRAVENOUS AS NEEDED
Status: DISCONTINUED | OUTPATIENT
Start: 2025-04-14 | End: 2025-04-14

## 2025-04-14 RX ORDER — DEXAMETHASONE SODIUM PHOSPHATE 10 MG/ML
INJECTION, SOLUTION INTRAMUSCULAR; INTRAVENOUS AS NEEDED
Status: DISCONTINUED | OUTPATIENT
Start: 2025-04-14 | End: 2025-04-14

## 2025-04-14 RX ORDER — ROCURONIUM BROMIDE 10 MG/ML
INJECTION, SOLUTION INTRAVENOUS AS NEEDED
Status: DISCONTINUED | OUTPATIENT
Start: 2025-04-14 | End: 2025-04-14

## 2025-04-14 RX ORDER — FENTANYL CITRATE/PF 50 MCG/ML
25 SYRINGE (ML) INJECTION
Status: DISCONTINUED | OUTPATIENT
Start: 2025-04-14 | End: 2025-04-14 | Stop reason: SDUPTHER

## 2025-04-14 RX ORDER — HYDROMORPHONE HCL/PF 1 MG/ML
0.5 SYRINGE (ML) INJECTION
Status: DISCONTINUED | OUTPATIENT
Start: 2025-04-14 | End: 2025-04-14 | Stop reason: HOSPADM

## 2025-04-14 RX ORDER — ONDANSETRON 2 MG/ML
4 INJECTION INTRAMUSCULAR; INTRAVENOUS EVERY 4 HOURS PRN
Status: DISCONTINUED | OUTPATIENT
Start: 2025-04-14 | End: 2025-04-14 | Stop reason: SDUPTHER

## 2025-04-14 RX ORDER — SENNOSIDES 8.6 MG
650 CAPSULE ORAL EVERY 8 HOURS PRN
Qty: 30 TABLET | Refills: 0 | Status: SHIPPED | OUTPATIENT
Start: 2025-04-14

## 2025-04-14 RX ORDER — HYDROMORPHONE HCL/PF 1 MG/ML
SYRINGE (ML) INJECTION AS NEEDED
Status: DISCONTINUED | OUTPATIENT
Start: 2025-04-14 | End: 2025-04-14

## 2025-04-14 RX ORDER — DIPHENHYDRAMINE HYDROCHLORIDE 50 MG/ML
12.5 INJECTION, SOLUTION INTRAMUSCULAR; INTRAVENOUS ONCE AS NEEDED
Status: DISCONTINUED | OUTPATIENT
Start: 2025-04-14 | End: 2025-04-14 | Stop reason: HOSPADM

## 2025-04-14 RX ORDER — ONDANSETRON 2 MG/ML
4 INJECTION INTRAMUSCULAR; INTRAVENOUS EVERY 4 HOURS PRN
Status: DISCONTINUED | OUTPATIENT
Start: 2025-04-14 | End: 2025-04-14 | Stop reason: HOSPADM

## 2025-04-14 RX ORDER — SODIUM CHLORIDE, SODIUM LACTATE, POTASSIUM CHLORIDE, CALCIUM CHLORIDE 600; 310; 30; 20 MG/100ML; MG/100ML; MG/100ML; MG/100ML
INJECTION, SOLUTION INTRAVENOUS CONTINUOUS PRN
Status: DISCONTINUED | OUTPATIENT
Start: 2025-04-14 | End: 2025-04-14

## 2025-04-14 RX ADMIN — LIDOCAINE HYDROCHLORIDE 50 MG: 10 INJECTION, SOLUTION EPIDURAL; INFILTRATION; INTRACAUDAL; PERINEURAL at 12:38

## 2025-04-14 RX ADMIN — CEFAZOLIN SODIUM 2000 MG: 2 SOLUTION INTRAVENOUS at 12:31

## 2025-04-14 RX ADMIN — ROCURONIUM BROMIDE 30 MG: 10 INJECTION, SOLUTION INTRAVENOUS at 12:38

## 2025-04-14 RX ADMIN — SUGAMMADEX 200 MG: 100 INJECTION, SOLUTION INTRAVENOUS at 13:22

## 2025-04-14 RX ADMIN — PROPOFOL 20 MG: 10 INJECTION, EMULSION INTRAVENOUS at 12:55

## 2025-04-14 RX ADMIN — SODIUM CHLORIDE, SODIUM LACTATE, POTASSIUM CHLORIDE, AND CALCIUM CHLORIDE: .6; .31; .03; .02 INJECTION, SOLUTION INTRAVENOUS at 11:36

## 2025-04-14 RX ADMIN — PROPOFOL 250 MG: 10 INJECTION, EMULSION INTRAVENOUS at 12:38

## 2025-04-14 RX ADMIN — DEXAMETHASONE SODIUM PHOSPHATE 10 MG: 10 INJECTION, SOLUTION INTRAMUSCULAR; INTRAVENOUS at 12:46

## 2025-04-14 RX ADMIN — HYDROMORPHONE HYDROCHLORIDE 0.5 MG: 1 INJECTION, SOLUTION INTRAMUSCULAR; INTRAVENOUS; SUBCUTANEOUS at 13:33

## 2025-04-14 RX ADMIN — MIDAZOLAM 2 MG: 1 INJECTION INTRAMUSCULAR; INTRAVENOUS at 12:31

## 2025-04-14 RX ADMIN — ONDANSETRON 4 MG: 2 INJECTION INTRAMUSCULAR; INTRAVENOUS at 12:31

## 2025-04-14 RX ADMIN — FENTANYL CITRATE 50 MCG: 50 INJECTION, SOLUTION INTRAMUSCULAR; INTRAVENOUS at 12:55

## 2025-04-14 RX ADMIN — FENTANYL CITRATE 50 MCG: 50 INJECTION, SOLUTION INTRAMUSCULAR; INTRAVENOUS at 12:38

## 2025-04-14 RX ADMIN — KETOROLAC TROMETHAMINE 15 MG: 30 INJECTION, SOLUTION INTRAMUSCULAR; INTRAVENOUS at 13:09

## 2025-04-14 RX ADMIN — GLYCOPYRROLATE 0.1 MG: 0.2 INJECTION INTRAMUSCULAR; INTRAVENOUS at 12:31

## 2025-04-14 RX ADMIN — HYDROMORPHONE HYDROCHLORIDE 0.5 MG: 1 INJECTION, SOLUTION INTRAMUSCULAR; INTRAVENOUS; SUBCUTANEOUS at 12:59

## 2025-04-14 NOTE — INTERVAL H&P NOTE
H&P reviewed. After examining the patient I find no changes in the patients condition since the H&P had been written.  Will plan for excision of the soft tissue mass.  Patient is amenable to risks and benefits, and I will proceed back to the operating room expeditiously.    Vitals:    04/14/25 1117   BP: 134/77   Pulse: 89   Resp: 16   Temp: 97.6 °F (36.4 °C)   SpO2: 96%

## 2025-04-14 NOTE — ANESTHESIA POSTPROCEDURE EVALUATION
Post-Op Assessment Note    CV Status:  Stable  Pain Score: 0    Pain management: adequate       Mental Status:  Alert and awake   Hydration Status:  Euvolemic   PONV Controlled:  Controlled   Airway Patency:  Patent     Post Op Vitals Reviewed: Yes    No anethesia notable event occurred.    Staff: CRNA           Last Filed PACU Vitals:  Vitals Value Taken Time   Temp     Pulse     /89 04/14/25 1333   Resp     SpO2 96    Vitals shown include unfiled device data.

## 2025-04-14 NOTE — ANESTHESIA PREPROCEDURE EVALUATION
Procedure:  EXCISION BIOPSY MASS OF LEFT NECK (Left: Face)    Relevant Problems   ANESTHESIA   (+) PONV (postoperative nausea and vomiting)      CARDIO   (+) High blood pressure associated with diabetes  (HCC)    BMI 42    Physical Exam    Airway  Comment: norman  Mallampati score: II  TM Distance: >3 FB  Neck ROM: full     Dental   No notable dental hx     Cardiovascular      Pulmonary      Other Findings      Lab Results   Component Value Date    WBC 9.66 04/03/2025    HGB 15.6 04/03/2025     04/03/2025     Lab Results   Component Value Date    SODIUM 140 04/03/2025    K 3.5 04/03/2025    BUN 17 04/03/2025    CREATININE 0.93 04/03/2025    EGFR 104 04/03/2025     Anesthesia Plan  ASA Score- 2     Anesthesia Type- general with ASA Monitors.         Additional Monitors:     Airway Plan: ETT.           Plan Factors-Exercise tolerance (METS): >4 METS.    Chart reviewed.   Existing labs reviewed. Patient summary reviewed.    Patient is not a current smoker.              Induction- intravenous.    Postoperative Plan-         Informed Consent- Anesthetic plan and risks discussed with patient.  I personally reviewed this patient with the CRNA. Discussed and agreed on the Anesthesia Plan with the CRNA..      NPO Status:  Vitals Value Taken Time   Date of last liquid 04/14/25 04/14/25 1059   Time of last liquid 0000 04/14/25 1059   Date of last solid 04/13/25 04/14/25 1059   Time of last solid 1800 04/14/25 1059

## 2025-04-14 NOTE — OP NOTE
OPERATIVE REPORT  PATIENT NAME: Alessandro Camejo    :  1985  MRN: [id number]  Pt Location:  OR ROOM 03    SURGERY DATE: 2025    Surgeons and Role:     * Santiago Kim MD - Primary     * Ayleen Moss PA-C - Assisting    Preop Diagnosis:  Mass of soft tissue of neck [M79.89]    Post-Op Diagnosis Codes:     * Mass of soft tissue of neck [M79.89]    Procedure(s):  Left - EXCISION BIOPSY MASS OF LEFT NECK    Specimen(s):  ID Type Source Tests Collected by Time Destination   1 : Soft tissue mass left neck Tissue Soft Tissue, Other TISSUE EXAM Santiago Kim MD 2025 1258        Estimated Blood Loss:   Minimal    Drains:  * No LDAs found *    Anesthesia Type:   General    Operative Indications:  Mass of soft tissue of neck [M79.89]  39-year-old male with previously infected soft tissue mass of his left neck.  After an appropriate workup, and a discussion of risk and benefits, he opted to proceed back to the operating room for planned excision of soft tissue mass.    Operative Findings:  4 x 3 x 2 cm cystic soft tissue mass of the left neck.  The cyst extended down to the level of the platysma.      Complications:   None    Procedure and Technique:  The patient was seen in the Holding Room. The risks, benefits, complications, treatment options, and expected outcomes were discussed with the patient. The possibilities of reaction to medication, bleeding, infection, the need for additional procedures, failure to diagnose a condition, and creating a complication were discussed with the patient. The patient concurred with the proposed plan, giving informed consent.  The site of surgery properly noted/marked. The patient was taken to Operating Room, identified as Alessandro Camejo and staff verified patient name, , procedure, site, and laterality. A Time Out was held and the above information confirmed.    The patient was placed lying right-side-down.  The  left neck  was prepped and draped in  standard fashion.  Local anesthesia was used to anesthetize the skin surrounding a 4 cm lesion.  A transverse elliptical incision was made over the lesion.  Sharp and blunt dissection,Using scissors, knife, and cautery, were used to mobilize the mass which was in a subcutaneous location. 5 mm margins were taken. Skin, soft tissue, and the mass, and surrounding fat were taken. Hemostasis was achieved with cautery. The wound was irrigated.  The wound was closed in multiple layers using 2-0 Vicryl, 3-0 Vicryl suture for subcutaneous tissue and 4-0 Monocryl subcuticular stitch. The wound was dressed, and the patient was taken to PACU in stable condition.    Sponge, needle, and instrument counts were correct x2.      I was present for the entire procedure., A qualified resident physician was not available., and A physician assistant was required during the procedure for retraction, tissue handling, dissection and suturing.    Patient Disposition:  PACU  and hemodynamically stable             SIGNATURE: Santiago Kim MD  DATE: April 14, 2025  TIME: 1:15 PM

## 2025-04-14 NOTE — INTERIM OP NOTE
EXCISION BIOPSY MASS OF LEFT NECK  Postoperative Note  PATIENT NAME: Alessandro Camejo  : 1985  MRN: [id number]  [facility] OR ROOM 03    Surgery Date: 2025    Preop Diagnosis:  Mass of soft tissue of neck [M79.89]    Post-Op Diagnosis Codes:     * Mass of soft tissue of neck [M79.89]    Procedure(s) (LRB):  EXCISION BIOPSY MASS OF LEFT NECK (Left)    Surgeons and Role:     * Santiago Kim MD - Primary     * Ayleen Moss PA-C - Assisting    Specimens:  ID Type Source Tests Collected by Time Destination   1 : Soft tissue mass left neck Tissue Soft Tissue, Other TISSUE EXAM Santiago Kim MD 2025 1258        Estimated Blood Loss:   Minimal    Anesthesia Type:   General     Findings:    Left neck epidermal cyst  Complications:   None      SIGNATURE: Ayleen Moss PA-C   DATE: 2025   TIME: 1:29 PM

## 2025-04-14 NOTE — DISCHARGE INSTR - AVS FIRST PAGE
Post-Operative Care Instructions             Dr. Santiago Kim M.D.    1. General: You will feel pulling sensations around the wound and/or aches and pains around the incisions. This is normal. Even minor surgery is a change in your body and this is your body’s way of reaction to it. If you have had abdominal surgery, it may help to support the incision with a small pillow or blanket for comfort when moving or coughing.    2. Wound care:    Bandage/Dressing - Make sure to remove the bandage in about 48 hours, unless instructed otherwise. You usually don't have to redress the wound after 24-48 hours, unless for comfort. Keep the incision clean and dry. Let air get to it. If the Steri-Strips fall off, just keep the wound clean.     Glue - Leave glue alone, it will fall off on its own, no need for an additional dressings    3. Water: You may shower over the wound, unless there are drain tubes left in place. Do not bathe or use a pool or hot tub until cleared by the physician. You may shower right over the staples, glue or Steri-Strips and rinse wound with soapy water but do not scrub incision pat dry when you are done.    4. Activity: You may go up and down stairs, walk as much as you are comfortable, but walk at least 3 times each day. If you have had abdominal or hernia surgery, do not lift anything heavier than 15 pounds for at least 4 weeks.    5. Diet: You may resume a regular diet. If you had a same-day surgery or overnight stay surgery, you may wish to eat lightly for a few days: soups, crackers, and sandwiches. You may resume a regular diet when ready.    6. Medications: Resume all of your previous medications, unless told otherwise by the doctor. Tylenol and ibuoprofen is always fine, unless you are taking any narcotic pain medication containing Tylenol (such as Percocet, Darvocet, Vicodin, or anything containing acetaminophen). Do not take Tylenol if you're taking these medications. You do not need to take  the narcotic pain medications unless you are having significant pain and discomfort. You may resume your aspirin in 48 hours from surgery.     7. Driving: He will need someone to drive you home on the day of surgery. Do not drive or make any important decisions while on narcotic pain medication or 24 hours and after anesthesia or sedation for surgery. Generally, you may drive when your off all narcotic pain medications, and you can turn in your seat comfortably to check your blind spot.     8. Upset Stomach: You may take Maalox, Tums, or similar items for an upset stomach. If your narcotic pain medication causes an upset stomach, do not take it on an empty stomach. Try taking it with at least some crackers or toast.     9. Constipation: Patients often experienced constipation after surgery. You may take over-the-counter medication for this, such as Metamucil, Senokot, Dulcolax, milk of magnesia, etc. You may take a suppository unless you have had anorectal surgery such as a procedure on your hemorrhoids. If you experience significant nausea or vomiting after abdominal surgery, call the office before trying any of these medications.    10. Call the office: If you are experiencing any of the following, fevers above 101.5°, significant nausea or vomiting, if the wound develops drainage and/or is excessive redness around the wound, or if you have significant diarrhea or other worsening symptoms.    11. Pain: You may be given a prescription for pain. This will be given to the hospital, the day of surgery.    12. Sexual Activity: You may resume sexual activity when you feel ready and comfortable and your incision is sealed and healed without apparent infection risk.    [facility] [facility]  Phone: [phone]

## 2025-04-15 NOTE — ANESTHESIA POSTPROCEDURE EVALUATION
Post-Op Assessment Note    Last Filed PACU Vitals:  Vitals Value Taken Time   Temp 98.6 °F (37 °C) 04/14/25 1335   Pulse 76 04/14/25 1402   /71 04/14/25 1400   Resp 0 04/14/25 1402   SpO2 89 % 04/14/25 1402   Vitals shown include unfiled device data.    Modified Matilde:     Vitals Value Taken Time   Activity 2 04/14/25 1400   Respiration 2 04/14/25 1400   Circulation 2 04/14/25 1400   Consciousness 2 04/14/25 1400   Oxygen Saturation 2 04/14/25 1400     Modified Matilde Score: 10

## 2025-04-29 NOTE — PROGRESS NOTES
Assessment/Plan:   Alessandro Camejo is a 39 y.o.male who comes in today for postoperative check after excision/biopsy of left neck mass by Dr. Kim on 04/14/2025.    Patient doing well. Incision is mostly healed. No signs of active infection/cellulitis. However, developed a tiny seroma beneath the medial aspect of the incision in turn was leaking a serous fluid. Drained and probed about 1cm laterally and deep. Irrigated and did pack with one piece of 1/2 inch plain packing. Covered with gauze and tape. Instructed patient to let drain and have patient take out Friday am. Will return next week for wound check.     Pathology: Reviewed with patient, all questions answered.   Final Diagnosis   A. Soft tissue neck mass, excision:  - Skin with seborrhea keratosis (0.2 cm). Underlying soft tissue with reactive changes favored to be due to ruptured cyst. No residual cyst lining is identified.      Postoperative restrictions reviewed. All questions answered.     ______________________________________________________  HPI:  Alessandro Camejo is a 39 y.o.male who comes in today for postoperative check after recent excision/biopsy of left neck mass by Dr. Kim on 04/14/2025.    Currently doing well with some problems : Back of neck incision has been draining fluid off and on, no fever or chills,no nausea and no vomiting.      ROS:  General ROS: negative for - chills, fatigue, fever or night sweats, weight loss  Respiratory ROS: no cough, shortness of breath, or wheezing  Cardiovascular ROS: no chest pain or dyspnea on exertion  Genito-Urinary ROS: no dysuria, trouble voiding, or hematuria  Musculoskeletal ROS: negative for - gait disturbance, joint pain or muscle pain  Neurological ROS: no TIA or stroke symptoms  GI ROS: see HPI  Skin ROS: no new rashes or lesions   Lymphatic ROS: no new adenopathy noted by pt.   GYN ROS: see HPI, no new GYN history or bleeding noted  Psy ROS: no new mental or behavioral disturbances          Patient Active Problem List   Diagnosis    Mass of soft tissue of neck    High blood pressure associated with diabetes  (HCC)    Nasal turbinate hypertrophy    PONV (postoperative nausea and vomiting)       Allergies:  Bactrim [sulfamethoxazole-trimethoprim]      Current Outpatient Medications:     acetaminophen (TYLENOL) 650 mg CR tablet, Take 1 tablet (650 mg total) by mouth every 8 (eight) hours as needed for mild pain, Disp: 30 tablet, Rfl: 0    aspirin-acetaminophen-caffeine (EXCEDRIN MIGRAINE) 250-250-65 MG per tablet, Take 1 tablet by mouth if needed, Disp: , Rfl:     enalapril (VASOTEC) 20 mg tablet, , Disp: , Rfl:     metFORMIN (GLUCOPHAGE-XR) 500 mg 24 hr tablet, , Disp: , Rfl:     Multiple Vitamin (MULTIVITAMIN ADULT PO), Take 1 tablet by mouth daily, Disp: , Rfl:     Multiple Vitamins-Minerals (Centrum Men) TABS, , Disp: , Rfl:     Rybelsus 14 MG tablet, daily before breakfast, Disp: , Rfl:     simvastatin (ZOCOR) 10 mg tablet, , Disp: , Rfl:     Past Medical History:   Diagnosis Date    Diabetes mellitus (HCC)     Fissure, anal     Kidney stone     Obesity     PONV (postoperative nausea and vomiting)     Pyloric stenosis in pediatric patient        Past Surgical History:   Procedure Laterality Date    COLONOSCOPY      KIDNEY STONE SURGERY      MA EXC TUMOR SOFT TISSUE NECK/ANT THORAX SUBQ <3CM Left 4/14/2025    Procedure: EXCISION BIOPSY MASS OF LEFT NECK;  Surgeon: Santiago Kim MD;  Location: [facility];  Service: General       Family History   Problem Relation Age of Onset    Cancer Maternal Grandfather     Cancer Other         reports that he has never smoked. He has never used smokeless tobacco. He reports current alcohol use. He reports current drug use. Drug: Marijuana.    There were no vitals filed for this visit.    PHYSICAL EXAM  General: normal, cooperative, no distress    Incision: clean, dry, and mostly healed. Medial aspect of incision open and draining serous fluid. About 1cm  "lateral and deep. Packed with one piece of 1/2 plain packing. Covered with gauze and tape. No signs of infection.    Some portions of this record may have been generated with voice recognition software. There may be translation, syntax,  or grammatical errors. Occasional wrong word or \"sound-a-like\" substitutions may have occurred due to the inherent limitations of the voice recognition software. Read the chart carefully and recognize, using context, where substitutions may have occurred. If you have any questions, please contact the dictating provider for clarification or correction, as needed. This encounter has been coded by a non-certified coder.       DARRIN Cabrera    Date: 4/29/2025 Time: 10:36 AM    "

## 2025-04-30 ENCOUNTER — OFFICE VISIT (OUTPATIENT)
Dept: SURGERY | Facility: HOSPITAL | Age: 40
End: 2025-04-30

## 2025-04-30 VITALS
HEIGHT: 71 IN | HEART RATE: 84 BPM | SYSTOLIC BLOOD PRESSURE: 120 MMHG | DIASTOLIC BLOOD PRESSURE: 78 MMHG | WEIGHT: 308 LBS | TEMPERATURE: 97.5 F | OXYGEN SATURATION: 97 % | BODY MASS INDEX: 43.12 KG/M2

## 2025-04-30 DIAGNOSIS — M79.89 MASS OF SOFT TISSUE OF NECK: Primary | ICD-10-CM

## 2025-04-30 PROCEDURE — 99024 POSTOP FOLLOW-UP VISIT: CPT

## 2025-05-01 NOTE — PROGRESS NOTES
"Assessment/Plan:   Alessandro Camejo is a 39 y.o.male who comes in today for postoperative check after excision/biopsy of left neck mass by Dr. Kim on 04/14/2025.    1. Encounter for post surgical wound check (Primary)  2. Mass of soft tissue of neck    Patient doing well. Incision is mostly healed. No signs of active infection/cellulitis. However, patient did develop a tiny seroma beneath the medial aspect of the incision with only about 1 cm lateral and 1 cm depth. One tiny piece of 1/2 packing was placed on 04/30  due to location to allow drainage. Patient removed his packing on Friday 5/2 and has had little drainage since. Today, incision site has begun to fill in with fresh tissue. Unable to probe to any cavity or depth. One steri strip applied to most medial aspect of incision. Covered with gauze and tape. Discussed with patient that may take some time to heal but is already filling in with fresh tissue. Will return in 2 weeks for wound check or sooner if any issues arise.    Pathology: Reviewed with patient, all questions answered.   Final Diagnosis   A. Soft tissue neck mass, excision:  - Skin with seborrhea keratosis (0.2 cm). Underlying soft tissue with reactive changes favored to be due to ruptured cyst. No residual cyst lining is identified.      All questions asked and answered.   ______________________________________________________  HPI:  Alessandro Camejo is a 39 y.o.male who comes in today for postoperative check after recent excision/biopsy of left neck mass by Dr. Kim on 04/14/2025.    Currently doing well with some problems : Back of neck incision has been draining fluid off and on, no fever or chills,no nausea and no vomiting.    05/07/2025: Patient doing well. Having some discomfort or \"stinging sensation\" at times at the site depending when moving his neck. Removed packing on Friday and has been covering with gauze. Since then, very little drainage. Denies fever, chills, N&V. "       ROS:  General ROS: negative for - chills, fatigue, fever or night sweats, weight loss  Respiratory ROS: no cough, shortness of breath, or wheezing  Cardiovascular ROS: no chest pain or dyspnea on exertion  Genito-Urinary ROS: no dysuria, trouble voiding, or hematuria  Musculoskeletal ROS: negative for - gait disturbance, joint pain or muscle pain  Neurological ROS: no TIA or stroke symptoms  GI ROS: see HPI  Skin ROS: no new rashes or lesions   Lymphatic ROS: no new adenopathy noted by pt.   GYN ROS: see HPI, no new GYN history or bleeding noted  Psy ROS: no new mental or behavioral disturbances       Patient Active Problem List   Diagnosis    Mass of soft tissue of neck    High blood pressure associated with diabetes  (HCC)    Nasal turbinate hypertrophy    PONV (postoperative nausea and vomiting)       Allergies:  Bactrim [sulfamethoxazole-trimethoprim]      Current Outpatient Medications:     acetaminophen (TYLENOL) 650 mg CR tablet, Take 1 tablet (650 mg total) by mouth every 8 (eight) hours as needed for mild pain, Disp: 30 tablet, Rfl: 0    aspirin-acetaminophen-caffeine (EXCEDRIN MIGRAINE) 250-250-65 MG per tablet, Take 1 tablet by mouth if needed, Disp: , Rfl:     enalapril (VASOTEC) 20 mg tablet, , Disp: , Rfl:     metFORMIN (GLUCOPHAGE-XR) 500 mg 24 hr tablet, , Disp: , Rfl:     Multiple Vitamin (MULTIVITAMIN ADULT PO), Take 1 tablet by mouth daily, Disp: , Rfl:     Multiple Vitamins-Minerals (Centrum Men) TABS, , Disp: , Rfl:     Rybelsus 14 MG tablet, daily before breakfast, Disp: , Rfl:     simvastatin (ZOCOR) 10 mg tablet, , Disp: , Rfl:     Past Medical History:   Diagnosis Date    Diabetes mellitus (HCC)     Fissure, anal     Kidney stone     Obesity     PONV (postoperative nausea and vomiting)     Pyloric stenosis in pediatric patient        Past Surgical History:   Procedure Laterality Date    COLONOSCOPY      KIDNEY STONE SURGERY      AR EXC TUMOR SOFT TISSUE NECK/ANT THORAX SUBQ <3CM Left  "4/14/2025    Procedure: EXCISION BIOPSY MASS OF LEFT NECK;  Surgeon: Santiago Kim MD;  Location:  MAIN OR;  Service: General       Family History   Problem Relation Age of Onset    Cancer Maternal Grandfather     Cancer Other         reports that he has never smoked. He has never used smokeless tobacco. He reports current alcohol use. He reports current drug use. Drug: Marijuana.    There were no vitals filed for this visit.    PHYSICAL EXAM  General: normal, cooperative, no distress    Incision: clean, dry, and mostly healed. Medial aspect of incision open about 0.5 to 1cm. Probed with no cavity. Fresh tissue filling in area. No drainage. Placed one steri strip at the most medial aspect of incision. Covered with gauze and tape. No s/s of infection.    Some portions of this record may have been generated with voice recognition software. There may be translation, syntax,  or grammatical errors. Occasional wrong word or \"sound-a-like\" substitutions may have occurred due to the inherent limitations of the voice recognition software. Read the chart carefully and recognize, using context, where substitutions may have occurred. If you have any questions, please contact the dictating provider for clarification or correction, as needed. This encounter has been coded by a non-certified coder.       DARRIN Cabrera    Date: 5/1/2025 Time: 10:03 AM    "

## 2025-05-07 ENCOUNTER — OFFICE VISIT (OUTPATIENT)
Dept: SURGERY | Facility: HOSPITAL | Age: 40
End: 2025-05-07

## 2025-05-07 VITALS
SYSTOLIC BLOOD PRESSURE: 121 MMHG | OXYGEN SATURATION: 97 % | HEART RATE: 88 BPM | DIASTOLIC BLOOD PRESSURE: 83 MMHG | WEIGHT: 308 LBS | TEMPERATURE: 97.5 F | HEIGHT: 71 IN | BODY MASS INDEX: 43.12 KG/M2

## 2025-05-07 DIAGNOSIS — M79.89 MASS OF SOFT TISSUE OF NECK: ICD-10-CM

## 2025-05-07 DIAGNOSIS — Z48.89 ENCOUNTER FOR POST SURGICAL WOUND CHECK: Primary | ICD-10-CM

## 2025-05-07 PROCEDURE — 99024 POSTOP FOLLOW-UP VISIT: CPT

## 2025-05-15 NOTE — PROGRESS NOTES
"Assessment/Plan:   Alessandro Camejo is a 39 y.o.male who comes in today for postoperative check after excision/biopsy of left neck mass by Dr. Kim on 04/14/2025.    1. Encounter for post surgical wound check (Primary)  2. Mass of soft tissue of neck    Patient doing well. Incision is healed. No signs of active infection/cellulitis. Follow up PRN.     Pathology: Reviewed with patient, all questions answered.   Final Diagnosis   A. Soft tissue neck mass, excision:  - Skin with seborrhea keratosis (0.2 cm). Underlying soft tissue with reactive changes favored to be due to ruptured cyst. No residual cyst lining is identified.      All questions asked and answered.   ______________________________________________________  HPI:  Alessandro Camejo is a 39 y.o.male who comes in today for postoperative check after recent excision/biopsy of left neck mass by Dr. Kim on 04/14/2025.    Currently doing well with some problems : Back of neck incision has been draining fluid off and on, no fever or chills,no nausea and no vomiting.    05/07/2025: Patient doing well. Having some discomfort or \"stinging sensation\" at times at the site depending when moving his neck. Removed packing on Friday and has been covering with gauze. Since then, very little drainage. Denies fever, chills, N&V.     05/21/2025: Patient doing well. Incision has closed up per patient. No drainage or pain. Denies fever, chills, N&V    ROS:  General ROS: negative for - chills, fatigue, fever or night sweats, weight loss  Respiratory ROS: no cough, shortness of breath, or wheezing  Cardiovascular ROS: no chest pain or dyspnea on exertion  Genito-Urinary ROS: no dysuria, trouble voiding, or hematuria  Musculoskeletal ROS: negative for - gait disturbance, joint pain or muscle pain  Neurological ROS: no TIA or stroke symptoms  GI ROS: see HPI  Skin ROS: no new rashes or lesions   Lymphatic ROS: no new adenopathy noted by pt.   GYN ROS: see HPI, no new GYN history " or bleeding noted  Psy ROS: no new mental or behavioral disturbances       Patient Active Problem List   Diagnosis    Mass of soft tissue of neck    High blood pressure associated with diabetes  (HCC)    Nasal turbinate hypertrophy    PONV (postoperative nausea and vomiting)       Allergies:  Bactrim [sulfamethoxazole-trimethoprim]      Current Outpatient Medications:     acetaminophen (TYLENOL) 650 mg CR tablet, Take 1 tablet (650 mg total) by mouth every 8 (eight) hours as needed for mild pain, Disp: 30 tablet, Rfl: 0    aspirin-acetaminophen-caffeine (EXCEDRIN MIGRAINE) 250-250-65 MG per tablet, Take 1 tablet by mouth if needed, Disp: , Rfl:     enalapril (VASOTEC) 20 mg tablet, , Disp: , Rfl:     metFORMIN (GLUCOPHAGE-XR) 500 mg 24 hr tablet, , Disp: , Rfl:     Multiple Vitamin (MULTIVITAMIN ADULT PO), Take 1 tablet by mouth daily, Disp: , Rfl:     Multiple Vitamins-Minerals (Centrum Men) TABS, , Disp: , Rfl:     Rybelsus 14 MG tablet, daily before breakfast, Disp: , Rfl:     simvastatin (ZOCOR) 10 mg tablet, , Disp: , Rfl:     Past Medical History:   Diagnosis Date    Diabetes mellitus (HCC)     Fissure, anal     Kidney stone     Obesity     PONV (postoperative nausea and vomiting)     Pyloric stenosis in pediatric patient        Past Surgical History:   Procedure Laterality Date    COLONOSCOPY      KIDNEY STONE SURGERY      MS EXC TUMOR SOFT TISSUE NECK/ANT THORAX SUBQ <3CM Left 4/14/2025    Procedure: EXCISION BIOPSY MASS OF LEFT NECK;  Surgeon: Santiago Kim MD;  Location: [facility] OR;  Service: General       Family History   Problem Relation Age of Onset    Cancer Maternal Grandfather     Cancer Other         reports that he has never smoked. He has never used smokeless tobacco. He reports current alcohol use. He reports current drug use. Drug: Marijuana.    There were no vitals filed for this visit.    PHYSICAL EXAM  General: normal, cooperative, no distress    Incision: clean, dry, and healed.     Some  "portions of this record may have been generated with voice recognition software. There may be translation, syntax,  or grammatical errors. Occasional wrong word or \"sound-a-like\" substitutions may have occurred due to the inherent limitations of the voice recognition software. Read the chart carefully and recognize, using context, where substitutions may have occurred. If you have any questions, please contact the dictating provider for clarification or correction, as needed. This encounter has been coded by a non-certified coder.       DARRIN Cabrera    Date: 5/15/2025 Time: 8:37 AM    "

## 2025-05-21 ENCOUNTER — OFFICE VISIT (OUTPATIENT)
Dept: SURGERY | Facility: HOSPITAL | Age: 40
End: 2025-05-21

## 2025-05-21 VITALS
OXYGEN SATURATION: 96 % | TEMPERATURE: 97.3 F | BODY MASS INDEX: 43.12 KG/M2 | WEIGHT: 308 LBS | HEART RATE: 74 BPM | HEIGHT: 71 IN | SYSTOLIC BLOOD PRESSURE: 140 MMHG | DIASTOLIC BLOOD PRESSURE: 83 MMHG

## 2025-05-21 DIAGNOSIS — Z48.89 ENCOUNTER FOR POST SURGICAL WOUND CHECK: Primary | ICD-10-CM

## 2025-05-21 PROCEDURE — 99024 POSTOP FOLLOW-UP VISIT: CPT

## (undated) DEVICE — NEPTUNE E-SEP SMOKE EVACUATION PENCIL, COATED, 70MM BLADE, PUSH BUTTON SWITCH: Brand: NEPTUNE E-SEP

## (undated) DEVICE — BETHLEHEM UNIVERSAL OUTPATIENT: Brand: CARDINAL HEALTH

## (undated) DEVICE — GLOVE INDICATOR PI UNDERGLOVE SZ 6.5 BLUE

## (undated) DEVICE — INTENDED FOR TISSUE SEPARATION, AND OTHER PROCEDURES THAT REQUIRE A SHARP SURGICAL BLADE TO PUNCTURE OR CUT.: Brand: BARD-PARKER SAFETY BLADES SIZE 15, STERILE

## (undated) DEVICE — SUT MONOCRYL 4-0 PS-2 27 IN Y426H

## (undated) DEVICE — REM POLYHESIVE ADULT PATIENT RETURN ELECTRODE: Brand: VALLEYLAB

## (undated) DEVICE — SPECIMEN CONTAINER STERILE PEEL PACK

## (undated) DEVICE — SUT VICRYL 2-0 SH 27 IN UNDYED J417H

## (undated) DEVICE — ANTIBACTERIAL UNDYED BRAIDED (POLYGLACTIN 910), SYNTHETIC ABSORBABLE SUTURE: Brand: COATED VICRYL

## (undated) DEVICE — GLOVE SRG BIOGEL 7.5

## (undated) DEVICE — GLOVE INDICATOR PI UNDERGLOVE SZ 7.5 BLUE

## (undated) DEVICE — CHLORAPREP HI-LITE 26ML ORANGE

## (undated) DEVICE — SMOKE EVAC FLUID SUCTION HEPA FILTER

## (undated) DEVICE — INSULATED BLADE ELECTRODE: Brand: EDGE

## (undated) DEVICE — GLOVE SRG BIOGEL 6.5

## (undated) DEVICE — PLUMEPEN PRO 10FT

## (undated) DEVICE — EXOFIN PRECISION PEN HIGH VISCOSITY TOPICAL SKIN ADHESIVE: Brand: EXOFIN PRECISION PEN, 1G